# Patient Record
Sex: MALE | Race: WHITE | NOT HISPANIC OR LATINO | URBAN - METROPOLITAN AREA
[De-identification: names, ages, dates, MRNs, and addresses within clinical notes are randomized per-mention and may not be internally consistent; named-entity substitution may affect disease eponyms.]

---

## 2017-08-14 ENCOUNTER — IMPORTED ENCOUNTER (OUTPATIENT)
Dept: URBAN - METROPOLITAN AREA CLINIC 50 | Facility: CLINIC | Age: 82
End: 2017-08-14

## 2017-08-14 NOTE — PATIENT DISCUSSION
"""Informed patient that their cataract OS > OD is visually significant and meets the criteria for cataract surgery to increase their vision and decrease their glare symptoms.  Order scans for IOL measurements

## 2018-08-13 ENCOUNTER — IMPORTED ENCOUNTER (OUTPATIENT)
Dept: URBAN - METROPOLITAN AREA CLINIC 50 | Facility: CLINIC | Age: 83
End: 2018-08-13

## 2019-09-30 ENCOUNTER — IMPORTED ENCOUNTER (OUTPATIENT)
Dept: URBAN - METROPOLITAN AREA CLINIC 50 | Facility: CLINIC | Age: 84
End: 2019-09-30

## 2021-04-18 ASSESSMENT — VISUAL ACUITY
OS_BAT: >20/400
OD_BAT: 20/60-
OD_PH: 20/30-
OD_CC: 20/50-2
OD_OTHER: 20/100. >20/400.
OD_OTHER: >20/400.
OS_CC: 20/70
OD_CC: J1
OS_PH: 20/40-
OD_BAT: 20/100
OS_OTHER: >20/400.
OS_CC: 20/50-2
OD_OTHER: 20/60-. >20/400.
OD_BAT: >20/400
OD_CC: J1+
OS_BAT: >20/400
OS_CC: J1
OS_OTHER: >20/400.
OD_CC: 20/40
OS_BAT: >20/400
OS_CC: J1+
OS_CC: 20/60-2
OS_PH: 20/40-
OS_OTHER: >20/400.
OD_CC: 20/30-2

## 2021-04-18 ASSESSMENT — TONOMETRY
OS_IOP_MMHG: 14
OD_IOP_MMHG: 17
OD_IOP_MMHG: 16
OS_IOP_MMHG: 15
OD_IOP_MMHG: 14
OS_IOP_MMHG: 17

## 2024-02-01 ENCOUNTER — NEW PATIENT (OUTPATIENT)
Dept: URBAN - METROPOLITAN AREA CLINIC 48 | Facility: LOCATION | Age: 89
End: 2024-02-01

## 2024-02-01 DIAGNOSIS — H35.363: ICD-10-CM

## 2024-02-01 DIAGNOSIS — H43.813: ICD-10-CM

## 2024-02-01 DIAGNOSIS — H25.13: ICD-10-CM

## 2024-02-01 DIAGNOSIS — H52.4: ICD-10-CM

## 2024-02-01 DIAGNOSIS — E11.9: ICD-10-CM

## 2024-02-01 PROCEDURE — 92134 CPTRZ OPH DX IMG PST SGM RTA: CPT

## 2024-02-01 PROCEDURE — 99204 OFFICE O/P NEW MOD 45 MIN: CPT

## 2024-02-01 PROCEDURE — 92015 DETERMINE REFRACTIVE STATE: CPT

## 2024-02-01 ASSESSMENT — KERATOMETRY
OD_AXISANGLE2_DEGREES: 85
OS_AXISANGLE2_DEGREES: 75
OD_K2POWER_DIOPTERS: 40.50
OD_AXISANGLE_DEGREES: 175
OD_K1POWER_DIOPTERS: 42.75
OS_AXISANGLE_DEGREES: 165
OS_K2POWER_DIOPTERS: 41.00
OS_K1POWER_DIOPTERS: 42.50

## 2024-02-01 ASSESSMENT — VISUAL ACUITY
OD_SC: 20/70-1
OU_CC: 20/50-2
OS_CC: J7@16"
OS_PH: 20/40-2
OS_GLARE: 20/60
OS_SC: 20/70
OD_PH: 20/40-1
OD_CC: J5@16"
OS_CC: 20/60
OS_GLARE: 20/50-1
OD_GLARE: 20/50
OD_GLARE: 20/40-1
OD_CC: 20/50-1

## 2024-02-01 ASSESSMENT — TONOMETRY
OS_IOP_MMHG: 15
OD_IOP_MMHG: 14

## 2024-02-08 ENCOUNTER — PRE-OP/H&P (OUTPATIENT)
Dept: URBAN - METROPOLITAN AREA CLINIC 53 | Facility: CLINIC | Age: 89
End: 2024-02-08

## 2024-02-08 DIAGNOSIS — H43.813: ICD-10-CM

## 2024-02-08 DIAGNOSIS — E11.9: ICD-10-CM

## 2024-02-08 DIAGNOSIS — H25.13: ICD-10-CM

## 2024-02-08 DIAGNOSIS — H04.123: ICD-10-CM

## 2024-02-08 DIAGNOSIS — H35.363: ICD-10-CM

## 2024-02-08 PROCEDURE — 92025IOL CORNEAL TOPOGRAPHY PREMIUM IOL

## 2024-02-08 PROCEDURE — 99214 OFFICE O/P EST MOD 30 MIN: CPT

## 2024-02-08 PROCEDURE — 92136 OPHTHALMIC BIOMETRY: CPT

## 2024-02-08 RX ORDER — SODIUM CHLORIDE 50 MG/ML: 1 SOLUTION OPHTHALMIC

## 2024-02-08 ASSESSMENT — KERATOMETRY
OD_K1POWER_DIOPTERS: 42.75
OS_K2POWER_DIOPTERS: 40.75
OD_AXISANGLE_DEGREES: 174
OS_K1POWER_DIOPTERS: 42.37
OD_K2POWER_DIOPTERS: 40.25
OD_AXISANGLE2_DEGREES: 84
OS_AXISANGLE2_DEGREES: 82
OS_AXISANGLE_DEGREES: 172

## 2024-02-08 ASSESSMENT — VISUAL ACUITY
OS_CC: 20/150-1
OS_PH: 20/60-1
OD_CC: 20/40-2 PUSH

## 2024-02-08 ASSESSMENT — TONOMETRY
OS_IOP_MMHG: 10
OD_IOP_MMHG: 13

## 2024-02-09 ASSESSMENT — KERATOMETRY
OD_AXISANGLE_DEGREES: 174
OS_K2POWER_DIOPTERS: 40.75
OD_K2POWER_DIOPTERS: 40.25
OS_AXISANGLE2_DEGREES: 82
OD_K1POWER_DIOPTERS: 42.75
OS_K1POWER_DIOPTERS: 42.37
OD_AXISANGLE2_DEGREES: 84
OS_AXISANGLE_DEGREES: 172

## 2024-02-13 ENCOUNTER — POST-OP (OUTPATIENT)
Dept: URBAN - METROPOLITAN AREA CLINIC 48 | Facility: LOCATION | Age: 89
End: 2024-02-13

## 2024-02-13 ENCOUNTER — SURGERY/PROCEDURE (OUTPATIENT)
Dept: URBAN - METROPOLITAN AREA SURGERY 16 | Facility: SURGERY | Age: 89
End: 2024-02-13

## 2024-02-13 DIAGNOSIS — Z96.1: ICD-10-CM

## 2024-02-13 DIAGNOSIS — H25.12: ICD-10-CM

## 2024-02-13 DIAGNOSIS — Z98.42: ICD-10-CM

## 2024-02-13 PROCEDURE — 99024 POSTOP FOLLOW-UP VISIT: CPT

## 2024-02-13 PROCEDURE — 66984 XCAPSL CTRC RMVL W/O ECP: CPT

## 2024-02-13 ASSESSMENT — VISUAL ACUITY: OS_SC: 20/100

## 2024-02-13 ASSESSMENT — TONOMETRY: OS_IOP_MMHG: 0

## 2024-02-20 ENCOUNTER — POST OP/EVAL OF SECOND EYE (OUTPATIENT)
Dept: URBAN - METROPOLITAN AREA CLINIC 48 | Facility: LOCATION | Age: 89
End: 2024-02-20

## 2024-02-20 DIAGNOSIS — H25.11: ICD-10-CM

## 2024-02-20 DIAGNOSIS — Z98.42: ICD-10-CM

## 2024-02-20 PROCEDURE — 9213626 A-SCAN/IOL PROFESSIONAL COMPONENT

## 2024-02-20 PROCEDURE — 99213 OFFICE O/P EST LOW 20 MIN: CPT

## 2024-02-20 ASSESSMENT — KERATOMETRY
OS_K2POWER_DIOPTERS: 40.75
OD_K1POWER_DIOPTERS: 43.00
OD_AXISANGLE2_DEGREES: 85
OS_AXISANGLE2_DEGREES: 75
OD_K2POWER_DIOPTERS: 40.25
OD_AXISANGLE_DEGREES: 175
OS_K1POWER_DIOPTERS: 42.75
OS_AXISANGLE_DEGREES: 165

## 2024-02-20 ASSESSMENT — TONOMETRY
OD_IOP_MMHG: 11
OS_IOP_MMHG: 12

## 2024-02-20 ASSESSMENT — VISUAL ACUITY
OD_CC: 20/50-1
OS_PH: 20/25-2
OD_PH: 20/30-1
OS_SC: 20/40-1

## 2024-02-22 ASSESSMENT — KERATOMETRY
OD_AXISANGLE2_DEGREES: 85
OS_K2POWER_DIOPTERS: 40.75
OD_K1POWER_DIOPTERS: 43.00
OS_K1POWER_DIOPTERS: 42.75
OS_AXISANGLE_DEGREES: 165
OS_AXISANGLE2_DEGREES: 75
OD_AXISANGLE_DEGREES: 175
OD_K2POWER_DIOPTERS: 40.25

## 2024-02-27 ENCOUNTER — SURGERY/PROCEDURE (OUTPATIENT)
Dept: URBAN - METROPOLITAN AREA SURGERY 16 | Facility: SURGERY | Age: 89
End: 2024-02-27

## 2024-02-27 ENCOUNTER — POST-OP (OUTPATIENT)
Dept: URBAN - METROPOLITAN AREA CLINIC 48 | Facility: LOCATION | Age: 89
End: 2024-02-27

## 2024-02-27 DIAGNOSIS — H25.11: ICD-10-CM

## 2024-02-27 DIAGNOSIS — Z96.1: ICD-10-CM

## 2024-02-27 DIAGNOSIS — Z98.42: ICD-10-CM

## 2024-02-27 DIAGNOSIS — Z98.41: ICD-10-CM

## 2024-02-27 PROCEDURE — 99024 POSTOP FOLLOW-UP VISIT: CPT

## 2024-02-27 PROCEDURE — 66984 XCAPSL CTRC RMVL W/O ECP: CPT

## 2024-02-27 ASSESSMENT — KERATOMETRY
OS_K2POWER_DIOPTERS: 40.75
OS_K1POWER_DIOPTERS: 42.75
OS_AXISANGLE2_DEGREES: 75
OD_AXISANGLE2_DEGREES: 85
OS_AXISANGLE_DEGREES: 165
OD_AXISANGLE_DEGREES: 175
OD_K1POWER_DIOPTERS: 43.00
OD_K2POWER_DIOPTERS: 40.25

## 2024-02-27 ASSESSMENT — TONOMETRY: OD_IOP_MMHG: 22

## 2024-02-27 ASSESSMENT — VISUAL ACUITY: OD_SC: 20/80-1

## 2024-03-05 ENCOUNTER — POST-OP (OUTPATIENT)
Dept: URBAN - METROPOLITAN AREA CLINIC 48 | Facility: LOCATION | Age: 89
End: 2024-03-05

## 2024-03-05 DIAGNOSIS — Z98.41: ICD-10-CM

## 2024-03-05 DIAGNOSIS — Z96.1: ICD-10-CM

## 2024-03-05 PROCEDURE — 99024 POSTOP FOLLOW-UP VISIT: CPT

## 2024-03-05 PROCEDURE — 92134 CPTRZ OPH DX IMG PST SGM RTA: CPT

## 2024-03-05 ASSESSMENT — VISUAL ACUITY
OD_PH: 20/40-2
OS_PH: 20/25
OS_SC: 20/30-2
OD_SC: 20/80-1

## 2024-03-05 ASSESSMENT — KERATOMETRY
OD_K1POWER_DIOPTERS: 42.50
OS_AXISANGLE_DEGREES: 165
OD_AXISANGLE2_DEGREES: 85
OS_AXISANGLE2_DEGREES: 75
OS_K1POWER_DIOPTERS: 42.75
OS_K2POWER_DIOPTERS: 40.75
OD_AXISANGLE_DEGREES: 175
OD_K2POWER_DIOPTERS: 39.50

## 2024-03-05 ASSESSMENT — TONOMETRY
OD_IOP_MMHG: 15
OS_IOP_MMHG: 15

## 2024-03-26 ENCOUNTER — POST-OP (OUTPATIENT)
Dept: URBAN - METROPOLITAN AREA CLINIC 48 | Facility: LOCATION | Age: 89
End: 2024-03-26

## 2024-03-26 DIAGNOSIS — E11.9: ICD-10-CM

## 2024-03-26 DIAGNOSIS — H43.813: ICD-10-CM

## 2024-03-26 DIAGNOSIS — H35.363: ICD-10-CM

## 2024-03-26 DIAGNOSIS — Z98.41: ICD-10-CM

## 2024-03-26 DIAGNOSIS — Z98.42: ICD-10-CM

## 2024-03-26 PROCEDURE — 92134 CPTRZ OPH DX IMG PST SGM RTA: CPT

## 2024-03-26 PROCEDURE — 99024 POSTOP FOLLOW-UP VISIT: CPT

## 2024-03-26 ASSESSMENT — KERATOMETRY
OS_K2POWER_DIOPTERS: 41.00
OD_K2POWER_DIOPTERS: 40.50
OS_K1POWER_DIOPTERS: 43.00
OS_AXISANGLE2_DEGREES: 80
OD_AXISANGLE_DEGREES: 5
OS_AXISANGLE_DEGREES: 170
OD_AXISANGLE2_DEGREES: 95
OD_K1POWER_DIOPTERS: 42.75

## 2024-03-26 ASSESSMENT — TONOMETRY
OD_IOP_MMHG: 17
OS_IOP_MMHG: 17

## 2024-03-26 ASSESSMENT — VISUAL ACUITY
OS_SC: 20/40-1
OD_SC: 20/100
OD_PH: 20/40-2